# Patient Record
Sex: FEMALE | URBAN - METROPOLITAN AREA
[De-identification: names, ages, dates, MRNs, and addresses within clinical notes are randomized per-mention and may not be internally consistent; named-entity substitution may affect disease eponyms.]

---

## 2021-09-30 ENCOUNTER — TELEPHONE ENCOUNTER (OUTPATIENT)
Dept: URBAN - METROPOLITAN AREA CLINIC 35 | Facility: CLINIC | Age: 46
End: 2021-09-30

## 2021-10-15 ENCOUNTER — TELEPHONE ENCOUNTER (OUTPATIENT)
Dept: URBAN - METROPOLITAN AREA CLINIC 35 | Facility: CLINIC | Age: 46
End: 2021-10-15

## 2021-10-18 ENCOUNTER — OFFICE VISIT (OUTPATIENT)
Dept: URBAN - NONMETROPOLITAN AREA CLINIC 5 | Facility: CLINIC | Age: 46
End: 2021-10-18

## 2021-10-18 NOTE — HPI-MIGRATED HPI
;     Colorectal Cancer Screening : This is a 45 year old female patient presents for colorectal cancer screening consult. Patient is here due to age . Patient admits this will be first colonoscopy. Patient admits family hx of colon cancer. Patient admits /denies family hx of colon polyps. Patient admits /denies normal bowel habits at this time. Patient denies any current symptoms of rectal bleeding, melena or mucus. Patient denies any concerns at this time ;

## 2022-04-30 ENCOUNTER — TELEPHONE ENCOUNTER (OUTPATIENT)
Dept: URBAN - METROPOLITAN AREA CLINIC 121 | Facility: CLINIC | Age: 47
End: 2022-04-30

## 2022-05-01 ENCOUNTER — TELEPHONE ENCOUNTER (OUTPATIENT)
Dept: URBAN - METROPOLITAN AREA CLINIC 121 | Facility: CLINIC | Age: 47
End: 2022-05-01

## 2022-05-01 RX ORDER — CHOLECALCIFEROL (VITAMIN D3) 50 MCG
TABLET ORAL
Status: ACTIVE | COMMUNITY
Start: 2012-01-31

## 2022-05-01 RX ORDER — HYOSCYAMINE SULFATE 0.12 MG/1
TAKE ONE OR TWO TABLETS BY MOUTH EVERY 6HRS AS NEEDED FOR ABDOMINAL PAIN TABLET ORAL
Status: ACTIVE | COMMUNITY
Start: 2014-02-05

## 2022-05-01 RX ORDER — DOCUSATE SODIUM 100 MG/1
CAPSULE ORAL
Status: ACTIVE | COMMUNITY
Start: 2012-01-31

## 2022-05-01 RX ORDER — HYOSCYAMINE SULFATE 0.12 MG/1
TAKE ONE OR TWO TABLETS BY MOUTH EVERY 6HRS AS NEEDED FOR ABDOMINAL PAIN TABLET ORAL
Status: ACTIVE | COMMUNITY
Start: 2013-01-07